# Patient Record
Sex: FEMALE | Race: WHITE
[De-identification: names, ages, dates, MRNs, and addresses within clinical notes are randomized per-mention and may not be internally consistent; named-entity substitution may affect disease eponyms.]

---

## 2017-03-15 ENCOUNTER — HOSPITAL ENCOUNTER (OUTPATIENT)
Dept: HOSPITAL 80 - FIMAGING | Age: 77
End: 2017-03-15
Attending: INTERNAL MEDICINE
Payer: COMMERCIAL

## 2017-03-15 DIAGNOSIS — Z78.0: ICD-10-CM

## 2017-03-15 DIAGNOSIS — Z13.820: Primary | ICD-10-CM

## 2017-03-15 DIAGNOSIS — M81.0: ICD-10-CM

## 2017-03-15 DIAGNOSIS — Z90.710: ICD-10-CM

## 2017-03-15 DIAGNOSIS — Z90.722: ICD-10-CM

## 2017-03-15 PROCEDURE — G0202 SCR MAMMO BI INCL CAD: HCPCS

## 2017-08-15 ENCOUNTER — HOSPITAL ENCOUNTER (OUTPATIENT)
Dept: HOSPITAL 80 - BHFA | Age: 77
End: 2017-08-15
Attending: INTERNAL MEDICINE
Payer: COMMERCIAL

## 2017-08-15 DIAGNOSIS — E11.9: ICD-10-CM

## 2017-08-15 DIAGNOSIS — I10: ICD-10-CM

## 2017-08-15 DIAGNOSIS — E78.5: ICD-10-CM

## 2017-08-15 DIAGNOSIS — I25.10: Primary | ICD-10-CM

## 2017-09-08 ENCOUNTER — HOSPITAL ENCOUNTER (OUTPATIENT)
Dept: HOSPITAL 80 - BHFA | Age: 77
End: 2017-09-08
Attending: INTERNAL MEDICINE
Payer: COMMERCIAL

## 2017-09-08 DIAGNOSIS — I20.8: ICD-10-CM

## 2017-09-08 DIAGNOSIS — I25.10: ICD-10-CM

## 2017-09-08 DIAGNOSIS — R07.9: Primary | ICD-10-CM

## 2017-09-08 PROCEDURE — 93017 CV STRESS TEST TRACING ONLY: CPT

## 2017-09-08 PROCEDURE — A9500 TC99M SESTAMIBI: HCPCS

## 2017-09-08 PROCEDURE — 78452 HT MUSCLE IMAGE SPECT MULT: CPT

## 2018-01-04 ENCOUNTER — HOSPITAL ENCOUNTER (OUTPATIENT)
Dept: HOSPITAL 80 - BHFA | Age: 78
End: 2018-01-04
Attending: INTERNAL MEDICINE
Payer: COMMERCIAL

## 2018-01-04 DIAGNOSIS — I25.10: ICD-10-CM

## 2018-01-04 DIAGNOSIS — E11.9: ICD-10-CM

## 2018-01-04 DIAGNOSIS — R51: Primary | ICD-10-CM

## 2018-04-01 ENCOUNTER — HOSPITAL ENCOUNTER (EMERGENCY)
Dept: HOSPITAL 80 - CED | Age: 78
Discharge: HOME | End: 2018-04-01
Payer: COMMERCIAL

## 2018-04-01 VITALS — TEMPERATURE: 98.2 F | HEART RATE: 81 BPM | OXYGEN SATURATION: 95 %

## 2018-04-01 VITALS — DIASTOLIC BLOOD PRESSURE: 67 MMHG | RESPIRATION RATE: 16 BRPM | SYSTOLIC BLOOD PRESSURE: 132 MMHG

## 2018-04-01 DIAGNOSIS — E11.9: ICD-10-CM

## 2018-04-01 DIAGNOSIS — T23.201A: Primary | ICD-10-CM

## 2018-04-01 DIAGNOSIS — T31.0: ICD-10-CM

## 2018-04-01 DIAGNOSIS — Z79.4: ICD-10-CM

## 2018-04-01 DIAGNOSIS — Z87.891: ICD-10-CM
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
## 2018-04-01 PROCEDURE — 2W28X4Z DRESSING OF RIGHT UPPER EXTREMITY USING BANDAGE: ICD-10-PCS
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
## 2018-04-02 ENCOUNTER — HOSPITAL ENCOUNTER (OUTPATIENT)
Dept: HOSPITAL 80 - FIMAGING | Age: 78
End: 2018-04-02
Attending: INTERNAL MEDICINE
Payer: COMMERCIAL

## 2018-04-02 DIAGNOSIS — Z12.31: Primary | ICD-10-CM

## 2018-11-01 ENCOUNTER — HOSPITAL ENCOUNTER (OUTPATIENT)
Dept: HOSPITAL 80 - BHFA | Age: 78
End: 2018-11-01
Attending: PHYSICIAN ASSISTANT
Payer: COMMERCIAL

## 2018-11-01 DIAGNOSIS — E78.5: ICD-10-CM

## 2018-11-01 DIAGNOSIS — I25.10: Primary | ICD-10-CM

## 2018-11-01 DIAGNOSIS — I10: ICD-10-CM

## 2019-02-19 ENCOUNTER — HOSPITAL ENCOUNTER (OUTPATIENT)
Dept: HOSPITAL 80 - BHFA | Age: 79
End: 2019-02-19
Attending: INTERNAL MEDICINE
Payer: COMMERCIAL

## 2019-02-19 DIAGNOSIS — R00.2: Primary | ICD-10-CM

## 2019-04-03 ENCOUNTER — HOSPITAL ENCOUNTER (OUTPATIENT)
Dept: HOSPITAL 80 - EMCIMAGING | Age: 79
End: 2019-04-03
Attending: INTERNAL MEDICINE
Payer: COMMERCIAL

## 2019-04-03 DIAGNOSIS — Z12.31: Primary | ICD-10-CM
